# Patient Record
Sex: FEMALE | Race: WHITE | NOT HISPANIC OR LATINO | URBAN - METROPOLITAN AREA
[De-identification: names, ages, dates, MRNs, and addresses within clinical notes are randomized per-mention and may not be internally consistent; named-entity substitution may affect disease eponyms.]

---

## 2017-11-16 ENCOUNTER — RECORDS - HEALTHEAST (OUTPATIENT)
Dept: LAB | Facility: CLINIC | Age: 82
End: 2017-11-16

## 2017-11-16 LAB
CHOLEST SERPL-MCNC: 169 MG/DL
FASTING STATUS PATIENT QL REPORTED: NORMAL
HDLC SERPL-MCNC: 56 MG/DL
LDLC SERPL CALC-MCNC: 93 MG/DL
TRIGL SERPL-MCNC: 100 MG/DL

## 2018-01-22 ENCOUNTER — RECORDS - HEALTHEAST (OUTPATIENT)
Dept: LAB | Facility: CLINIC | Age: 83
End: 2018-01-22

## 2018-01-24 LAB — BACTERIA SPEC CULT: ABNORMAL

## 2018-07-31 ENCOUNTER — RECORDS - HEALTHEAST (OUTPATIENT)
Dept: LAB | Facility: CLINIC | Age: 83
End: 2018-07-31

## 2018-08-03 LAB — BACTERIA SPEC CULT: ABNORMAL

## 2018-08-16 ENCOUNTER — RECORDS - HEALTHEAST (OUTPATIENT)
Dept: LAB | Facility: CLINIC | Age: 83
End: 2018-08-16

## 2018-08-16 LAB
ANION GAP SERPL CALCULATED.3IONS-SCNC: 9 MMOL/L (ref 5–18)
BUN SERPL-MCNC: 31 MG/DL (ref 8–28)
CALCIUM SERPL-MCNC: 9.7 MG/DL (ref 8.5–10.5)
CHLORIDE BLD-SCNC: 106 MMOL/L (ref 98–107)
CO2 SERPL-SCNC: 26 MMOL/L (ref 22–31)
CREAT SERPL-MCNC: 1 MG/DL (ref 0.6–1.1)
DIGOXIN LEVEL LHE- HISTORICAL: 0.9 NG/ML (ref 0.5–2)
GFR SERPL CREATININE-BSD FRML MDRD: 53 ML/MIN/1.73M2
GLUCOSE BLD-MCNC: 102 MG/DL (ref 70–125)
POTASSIUM BLD-SCNC: 4.3 MMOL/L (ref 3.5–5)
SODIUM SERPL-SCNC: 141 MMOL/L (ref 136–145)

## 2019-04-16 ENCOUNTER — RECORDS - HEALTHEAST (OUTPATIENT)
Dept: ADMINISTRATIVE | Facility: OTHER | Age: 84
End: 2019-04-16

## 2019-04-17 ENCOUNTER — RECORDS - HEALTHEAST (OUTPATIENT)
Dept: LAB | Facility: CLINIC | Age: 84
End: 2019-04-17

## 2019-04-17 LAB
ANION GAP SERPL CALCULATED.3IONS-SCNC: 12 MMOL/L (ref 5–18)
BUN SERPL-MCNC: 22 MG/DL (ref 8–28)
CALCIUM SERPL-MCNC: 10.1 MG/DL (ref 8.5–10.5)
CHLORIDE BLD-SCNC: 103 MMOL/L (ref 98–107)
CO2 SERPL-SCNC: 25 MMOL/L (ref 22–31)
CREAT SERPL-MCNC: 0.99 MG/DL (ref 0.6–1.1)
DIGOXIN LEVEL LHE- HISTORICAL: 0.8 NG/ML (ref 0.5–2)
GFR SERPL CREATININE-BSD FRML MDRD: 53 ML/MIN/1.73M2
GLUCOSE BLD-MCNC: 107 MG/DL (ref 70–125)
POTASSIUM BLD-SCNC: 4.5 MMOL/L (ref 3.5–5)
SODIUM SERPL-SCNC: 140 MMOL/L (ref 136–145)

## 2019-04-24 ENCOUNTER — COMMUNICATION - HEALTHEAST (OUTPATIENT)
Dept: TELEHEALTH | Facility: CLINIC | Age: 84
End: 2019-04-24

## 2019-04-24 ENCOUNTER — HOSPITAL ENCOUNTER (OUTPATIENT)
Dept: NUCLEAR MEDICINE | Facility: CLINIC | Age: 84
Discharge: HOME | End: 2019-04-24
Attending: ORTHOPAEDIC SURGERY

## 2019-04-24 DIAGNOSIS — M17.9 OSTEOARTHRITIS OF KNEE: ICD-10-CM

## 2019-06-05 ENCOUNTER — OFFICE VISIT - HEALTHEAST (OUTPATIENT)
Dept: VASCULAR SURGERY | Facility: CLINIC | Age: 84
End: 2019-06-05

## 2019-06-05 DIAGNOSIS — I87.8 VENOUS STASIS: ICD-10-CM

## 2019-06-05 RX ORDER — VIT A/VIT C/VIT E/ZINC/COPPER 7160-113
TABLET, DELAYED RELEASE (ENTERIC COATED) ORAL 2 TIMES DAILY
Status: SHIPPED | COMMUNITY
Start: 2019-06-05

## 2019-06-05 RX ORDER — DILTIAZEM HYDROCHLORIDE 360 MG/1
CAPSULE, EXTENDED RELEASE ORAL
Status: SHIPPED | COMMUNITY
Start: 2019-05-27

## 2019-06-05 ASSESSMENT — MIFFLIN-ST. JEOR: SCORE: 1664.66

## 2019-06-12 ENCOUNTER — AMBULATORY - HEALTHEAST (OUTPATIENT)
Dept: OTHER | Facility: CLINIC | Age: 84
End: 2019-06-12

## 2019-09-04 ENCOUNTER — OFFICE VISIT - HEALTHEAST (OUTPATIENT)
Dept: VASCULAR SURGERY | Facility: CLINIC | Age: 84
End: 2019-09-04

## 2019-09-04 ENCOUNTER — RECORDS - HEALTHEAST (OUTPATIENT)
Dept: VASCULAR ULTRASOUND | Facility: CLINIC | Age: 84
End: 2019-09-04

## 2019-09-04 DIAGNOSIS — I87.8 VENOUS STASIS: ICD-10-CM

## 2019-09-04 DIAGNOSIS — I87.8 OTHER SPECIFIED DISORDERS OF VEINS: ICD-10-CM

## 2020-01-23 ENCOUNTER — RECORDS - HEALTHEAST (OUTPATIENT)
Dept: LAB | Facility: CLINIC | Age: 85
End: 2020-01-23

## 2020-01-23 LAB
ANION GAP SERPL CALCULATED.3IONS-SCNC: 7 MMOL/L (ref 5–18)
BUN SERPL-MCNC: 16 MG/DL (ref 8–28)
CALCIUM SERPL-MCNC: 9.9 MG/DL (ref 8.5–10.5)
CHLORIDE BLD-SCNC: 102 MMOL/L (ref 98–107)
CO2 SERPL-SCNC: 32 MMOL/L (ref 22–31)
CREAT SERPL-MCNC: 1.07 MG/DL (ref 0.6–1.1)
DIGOXIN LEVEL LHE- HISTORICAL: 0.8 NG/ML (ref 0.5–2)
GFR SERPL CREATININE-BSD FRML MDRD: 49 ML/MIN/1.73M2
GLUCOSE BLD-MCNC: 86 MG/DL (ref 70–125)
POTASSIUM BLD-SCNC: 3.9 MMOL/L (ref 3.5–5)
SODIUM SERPL-SCNC: 141 MMOL/L (ref 136–145)

## 2020-04-17 ENCOUNTER — RECORDS - HEALTHEAST (OUTPATIENT)
Dept: LAB | Facility: CLINIC | Age: 85
End: 2020-04-17

## 2020-04-19 LAB — BACTERIA SPEC CULT: ABNORMAL

## 2020-04-30 ENCOUNTER — RECORDS - HEALTHEAST (OUTPATIENT)
Dept: LAB | Facility: CLINIC | Age: 85
End: 2020-04-30

## 2020-05-02 LAB — BACTERIA SPEC CULT: ABNORMAL

## 2020-07-21 ENCOUNTER — RECORDS - HEALTHEAST (OUTPATIENT)
Dept: LAB | Facility: CLINIC | Age: 85
End: 2020-07-21

## 2020-07-22 LAB
ANION GAP SERPL CALCULATED.3IONS-SCNC: 9 MMOL/L (ref 5–18)
BUN SERPL-MCNC: 15 MG/DL (ref 8–28)
CALCIUM SERPL-MCNC: 9.2 MG/DL (ref 8.5–10.5)
CHLORIDE BLD-SCNC: 104 MMOL/L (ref 98–107)
CHOLEST SERPL-MCNC: 158 MG/DL
CO2 SERPL-SCNC: 28 MMOL/L (ref 22–31)
CREAT SERPL-MCNC: 0.93 MG/DL (ref 0.6–1.1)
DIGOXIN LEVEL LHE- HISTORICAL: 0.7 NG/ML (ref 0.5–2)
FASTING STATUS PATIENT QL REPORTED: NORMAL
GFR SERPL CREATININE-BSD FRML MDRD: 57 ML/MIN/1.73M2
GLUCOSE BLD-MCNC: 92 MG/DL (ref 70–125)
HDLC SERPL-MCNC: 54 MG/DL
LDLC SERPL CALC-MCNC: 81 MG/DL
POTASSIUM BLD-SCNC: 3.9 MMOL/L (ref 3.5–5)
SODIUM SERPL-SCNC: 141 MMOL/L (ref 136–145)
TRIGL SERPL-MCNC: 114 MG/DL

## 2021-04-13 ENCOUNTER — RECORDS - HEALTHEAST (OUTPATIENT)
Dept: LAB | Facility: CLINIC | Age: 86
End: 2021-04-13

## 2021-04-15 LAB — BACTERIA SPEC CULT: ABNORMAL

## 2021-05-29 ENCOUNTER — RECORDS - HEALTHEAST (OUTPATIENT)
Dept: ADMINISTRATIVE | Facility: CLINIC | Age: 86
End: 2021-05-29

## 2021-05-29 NOTE — PROGRESS NOTES
This is a new consult for Varicose Veins. The patient has varicose veins that are problematic in bilateral legs. Symptoms patient has been experiencing are heaviness, aching,  itching, tiredness, cramps, discoloration and  swelling. Patient has not been wearing compression stockings.     Discoloration  is present.  Pt has not been using pain medication or antiinflammatory's.

## 2021-05-29 NOTE — PROGRESS NOTES
S: Patient was seen in [place] to be measured for knee-high velcro calf pieces. RX on-file from Dr. Morrow is current.    O: Goal is to evaluate and measure patient for a compression garment that will help control her venous insufficiency. Observations show there is swelling present. The expected outcome with compliant use is to reduce swelling and control the patient s condition.     A: I took measurements for Juxtalite HD Velcro calf pieces and sized her into a L-X, Short. This garment when held up to her leg was too short so we donned an L-X, Long. This garment had too much fabric at the top of her calf. She sizes right on the line of a Large and Large-X so we then tried the Large, Long, which was a great fit. I assisted Savita in donning her compression garments. Once donned, the garments appeared to be fitting well and she stated they were comfortable. She was instructed on the donning and doffing process, the uses of the garments, and how to care for the items. She went home with two JuxtaLite HD Velcro calf pieces in the large, long. She paid OOP with the 20% discount for the garments.    P: She is to call with any further needs.  Goal is to maintain a home program.

## 2021-05-31 ENCOUNTER — RECORDS - HEALTHEAST (OUTPATIENT)
Dept: ADMINISTRATIVE | Facility: CLINIC | Age: 86
End: 2021-05-31

## 2021-06-01 NOTE — PROGRESS NOTES
This is a follow up for Varicose Veins. The patient has varicose veins that are problematic in bilateral legs. Symptoms patient has been experiencing are heaviness, aching,  itching, tiredness, cramps, discoloration and swelling. Patient has been wearing compression stockings. Patient had US prior to appointment.

## 2021-06-01 NOTE — PROGRESS NOTES
[facility] Surgery Follow up    HPI:    86 y.o. year old female who returns for a follow up.  I saw her here before back in June and she is here for follow-up having her ultrasound done today.  She has leg troubles and pain symptoms mostly of her left leg and knee region.    Allergies:Patient has no known allergies.    Past Medical History:   Diagnosis Date     Atrial fibrillation (H)      DVT (deep venous thrombosis) (H) 1961    left leg     History of anesthesia complications      Hypertension      Left hemiparesis (H) 2007     Osteoarthritis      PONV (postoperative nausea and vomiting)      Stroke (H) 2007     Urinary incontinence        Past Surgical History:   Procedure Laterality Date     APPENDECTOMY       CATARACT EXTRACTION       CHOLECYSTECTOMY       FOOT SURGERY Right      HYSTERECTOMY       ID APPENDECTOMY      Description: Appendectomy;  Recorded: 07/31/2012;     ID REMOVAL GALLBLADDER      Description: Cholecystectomy;  Recorded: 07/31/2012;     ID TOTAL KNEE ARTHROPLASTY Right 12/2/2015    Procedure: TOTAL KNEE ARTHROPLASTY, RIGHT;  Surgeon: Killian Roberts MD;  Location: [facility];  Service: Orthopedics       CURRENT MEDS:  Current Outpatient Medications on File Prior to Visit   Medication Sig Dispense Refill     digoxin (LANOXIN) 125 mcg tablet Take 125 mcg by mouth daily.       diltiazem (TIAZAC) 360 MG 24 hr capsule        Lactobacillus rhamnosus GG (CULTURELLE) 10-15 Billion cell capsule Take 1 capsule by mouth daily.       lisinopril (PRINIVIL,ZESTRIL) 20 MG tablet Take 40 mg by mouth daily.        MULTIVITAMIN ORAL Take 1 tablet by mouth daily.       vit A/vit C/vit E/zinc/copper (ICAPS AREDS ORAL) Take by mouth 2 (two) times a day.       warfarin (COUMADIN) 6 MG tablet Take by mouth See Admin Instructions. New Order: Coumadin 6mg po daily.             No current facility-administered medications on file prior to visit.        Family History   Problem Relation Age of Onset     Cancer  Mother      Heart disease Father      Anesthesia problems Neg Hx      Clotting disorder Neg Hx         reports that she has never smoked. She has never used smokeless tobacco. She reports that she does not drink alcohol or use drugs.    Review of Systems:  Negative except left leg pain mostly centered on her left knee.  His ambulation issues secondary to her knee issues she also has kind of balance issues going on.    OBJECTIVE:  There were no vitals filed for this visit.  There is no height or weight on file to calculate BMI.    EXAM:  GENERAL: This is a well-developed 86 y.o. female who appears her stated age  HEAD: normocephalic  HEENT: Pupils equal and reactive bilaterally  CARDIAC: RRR without murmur  CHEST/LUNG:  Clear to auscultation  ABDOMEN: Soft, nontender, nondistended, no masses    NEUROLOGIC: Focally intact, nonfocal  VASCULAR: Pulses intact, symmetrical upper and lower extremities.      LABS:  Lab Results   Component Value Date    WBC 4.9 12/02/2015    HGB 8.6 (L) 12/06/2015    HCT 41.5 12/02/2015    MCV 91 12/02/2015     12/02/2015     INR/Prothrombin Time      No results found for: HGBA1C  No results found for: ALT, AST, GGT, ALKPHOS, BILITOT     Images:     US Venous Insufficiency Legs Bilateral (Order [id number])   Imaging   Date: 9/4/2019 Department: [facility] Ultrasound [place] Released By: Lilliana Santos Authorizing: Hadley Morrow MD   Study Result     [facility]  LOCATION: [facility]  DATE: 9/4/2019     EXAM: BILATERAL LOWER EXTREMITY DEEP AND SUPERFICIAL VENOUS DUPLEX ULTRASOUND WITH PHYSIOLOGIC TESTING      INDICATION: Symptomatic varicose veins, leg pain, leg swelling. Assess for incompetent veins.      TECHNIQUE: Supine and upright ultrasound of the deep and superficial veins with Valsalva and compression augmentation maneuvers. Duplex imaging is performed utilizing gray-scale, two-dimensional images, color-flow imaging,  Doppler waveform analysis, and   spectral Doppler imaging.      INCOMPETENCY CRITERIA: Deep vein reflux reported when greater than 1,000 ms flow reversal. Superficial vein reflux reported when greater than 600 ms flow reversal.  vein reflux reported as greater than 350 ms flow reversal.     RIGHT DEEP VEIN FINDINGS:     Normal compressibility and phasicity of the common femoral, profunda femoris, femoral, popliteal, and posterior tibial veins, without deep venous thrombus. There is reflux of Valsalva and augmentation within the common femoral, femoral vein, and   popliteal vein.     LEFT DEEP VEIN FINDINGS:     Normal compressibility and phasicity of the common femoral, profunda femoris, femoral, popliteal, and posterior tibial veins, without deep venous thrombus. There is reflux with Valsalva and augmentation within the common femoral and upper leg femoral   vein.     RIGHT SUPERFICIAL VEIN FINDINGS:  Great saphenous vein: Segmental venous incompetence involving the saphenofemoral junction to the proximal thigh. Vein diameter and spans 10 mm proximally to 6 mm distally. Vein is competent at the knee and mid calf.     Small saphenous vein: Competent from the saphenopopliteal junction to the mid calf.     No incompetent perforating veins or abnormal accessory veins identified.     LEFT SUPERFICIAL VEIN FINDINGS:  Great saphenous vein: Segmental incompetence at the saphenofemoral junction where the vein measures 9 mm in diameter. It is competent within the proximal thigh measuring 6 mm in diameter. Incompetence at the level of the knee measuring 5 mm diameter. It   is competent at the mid calf measuring 3 mm in diameter.     Small saphenous vein: Competent from the saphenopopliteal junction to the mid calf.     There is a left anterior accessory saphenous vein with venous incompetence measuring 9 mm proximally and 6 mm at the mid thigh.     IMPRESSION:   CONCLUSION:   1.  No deep venous thrombosis of either  lower extremity.  2.  Multisegment venous incompetence of the right great saphenous vein.  3.  Multisegment venous incompetence of the left great saphenous vein. Incompetent left anterior accessory saphenous vein spanning the proximal and mid thigh         Assessment/Plan:   1. Venous stasis    Patient has a significant amount of pain she had a right knee replaced.  The knee replacement Salter pain issues of her leg around her knee but she unfortunately had significant cardiac events and was in the ICU for 4 days.  She now has problems on her left leg mostly this is the same as her right was there so I thought it is from her orthopedic surgeon this could be vein related.  Her pain is lateral just involves the knee itself and we did a work-up today with an ultrasound.  Unfortunately her ultrasound shows that she has deep vein issues.  These issues are probably not amenable much closure issues like I could close one-vessel but I do not think that will solve the issues that she has and her deep problems are bright not contributing to the localized pain that she has around    At her age of 86 years of age she says that she does not do anything for her knee as far as replacement but she may get an injection I think she should proceed with that.  He could should continue to wear compression socks as she is been doing and she is understood in pursuing any other kind of procedure which will not help her with her deep insufficiency a closure procedure may only help some some visualized veins will not help her ultimately overall picture.          Return if symptoms worsen or fail to improve.     Hadley Morrow MD  [facility] Department of Surgery

## 2021-06-02 VITALS — BODY MASS INDEX: 33.05 KG/M2 | WEIGHT: 244 LBS | HEIGHT: 72 IN

## 2021-06-17 NOTE — PATIENT INSTRUCTIONS - HE
"Patient Instructions by Altagracia Vazquez LPN at 9/4/2019 11:40 AM     Author: Altagracia Vazquez LPN Service: -- Author Type: Licensed Nurse    Filed: 9/4/2019 11:43 AM Encounter Date: 9/4/2019 Status: Signed    : Altagracia Vazquez LPN (Licensed Nurse)       You should wear this socks as much as you can. It is especially important to wear them with long periods of sitting/standing, long car rides or if you will be flying. Compression socks should get refilled every 4-6 months. They do not need to be worn at night while in bed.    If you do a lot of standing it is good to do calf raises to help keep the blood pumping. If you sit a lot at work it is good to get up periodically to walk around. Elevation of the foot of your bed 4-6\" helps the blood return back to where it is needed.        Varicose Veins      Varicose veins are swollen, enlarged veins most often found in the legs. They are usually blue or purple in color and may bulge, twist, and stand out under the skin.  Normally, veins return blood from the body to the heart. The leg veins have one-way valves that prevent blood from flowing backward in the vein. When the valves are weak or damaged, blood backs up in the veins. This may cause some of the veins to swell and bulge and become varicose veins.  Symptoms  Varicose veins may or may not cause symptoms. If symptoms do occur, they can include:    Legs that feel tired, achy, heavy, or itchy    Leg muscle cramps    Skin changes, such as discoloration, dryness, redness, or rash (in more severe cases, you may also have sores on the skin called venous leg ulcers)  Risk Factors  There are a number of factors that increase the risk for varicose veins. These can include:    Being a woman    Being older    Sitting or standing for long periods    Being overweight    Being pregnant    Having a family history of varicose veins  Treatment begins with simple self-help measures (see below). If these dont help, " there are many procedures that can be done to shrink or remove varicose veins. Your healthcare provider can tell you more about these options, if needed.  Home care    Support or compression stockings will likely be prescribed. If so, be sure to wear them as directed. They may help improve blood flow.    Exercising helps strengthen your leg muscles and improve blood flow. To get the most benefit, choose exercises such as walking, swimming, or cycling. Also try to exercise for at least 30 minutes on most days.    Raising (elevating) your legs lets gravity help blood flow back to the heart. Sit or lie with your feet above heart level a few times throughout the day, or as directed.    Avoid long periods of sitting or standing. Change positions often. Also, move your ankles, toes and knees often. This may also help improve blood flow.    If you are overweight, talk with your healthcare provider about setting up a weight-loss plan. Maintaining a healthy weight can help reduce the strain on your veins. It may also improve symptoms, such as swelling and aching.    If you have dryness and itching, ask your provider about special lotions that can be applied to the skin to help improve symptoms.  Follow-up care  Follow up with your healthcare provider, or as directed. If imaging tests were done, youll be told the results and if there are any new findings that affect your care.  When to seek medical advice  Call your healthcare provider right away if any of these occur:    Sudden, severe leg swelling, pain, or redness    Symptoms worsen, or they dont improve with self-care    Bleeding from any affected veins    Ulcers form on the legs, ankles, or feet    Fever of 100.4 F (38 C) or higher, or as advised by your provider      Understanding Spider and Varicose Veins  Do you often hide your legs because of the way they look? You may have noticed tiny red or blue bursts (spider veins). Or maybe you have veins that bulge or look  twisted (varicose veins). If so, there are treatments that can help  What are the symptoms?  Spider veins or varicose veins may never be a problem. But sometimes they can cause legs to ache or swell. Your legs may also feel heavy and tired, or like theyre burning. These symptoms may be more severe at the end of the day. Prolonged sitting or standing can also make your symptoms worse.  Who gets spider and varicose veins?  Anyone can get spider or varicose veins. But vein problems tend to be hereditary (run in families). Other factors that can affect veins include:    Pregnancy, hormones, and birth control pills    A job where you stand or sit a lot    Extra weight or lack of exercise    Age         Spider veins look like tiny webs on the ankles, legs, and upper thighs.       Ropy, dark blue, red, or flesh-colored varicose veins are most common on the thighs, calves, and feet.    What can be done?  Spider and varicose veins can affect the way you feel about yourself. Talk to your healthcare provider about your concerns. There are treatments that can ease symptoms and make your legs look better.  Your treatment choices  Treatment may include self-care, sclerotherapy (injecting veins with a chemical), surgery, or newer nonsurgical minimally invasive therapies. Spider veins and some varicose veins can be treated with sclerotherapy. Large varicose veins can often be treated with newer minimally invasive procedures and, in rare cases, surgery may be needed.     Please call [facility] to schedule an appointment. If you received a prescription please bring it with you to your appointment. You may call one of the locations below, although some locations are limited to what they carry.    Office Locations  New Locations  [facility]  Home Medical Equipment  [address], [facility] [id number] [address]  [facility] ([facility])  [address], [facility]  [place] [address]  Phone [phone] /Fax [phone]        [facility]   [address]   Medical Equipment [address]/Orthotics and Prosthetics [address]  [place] [address]   Phone: [phone]  Fax [phone]    [facility]  [address]  [address]  [place] [address]  Phone: [phone]  Fax: [phone]    [facility] Medical Bld[facility]   [address]  [place] [address]  Phone: [phone]  Fax: [phone]    [facility] Professional Bldg[facility]  [address]  [place] [address]  Phone: [phone]  Fax: [phone]    [facility]  [address]  [place] [address]  Phone: [phone]  Fax: [phone]    [facility]  [address]   [place] [address]   Phone: [phone]  Fax: [phone]    [place]   [address] [place] [address]   Phone: [phone]  Fax: [phone]    [facility]  [address]  [place] [address]    [place] [phone]  [phone]  [place] [phone]  [email or web address]      [facility]   [address]             [address]                 [address]     [place] [address]      [place] [address]         [place] [address]  [phone]  [phone]     [phone]               Fax: [phone]  [email or web address]                                                     [facility]  [address] [address]  [phone]  [phone]  [email or web address]    Kenisha Huerta  [phone]  [email or web address]    [facility] Medical supply   [phone]    [facility]  [address]  [place], [place] [address] [phone]

## 2021-06-17 NOTE — PATIENT INSTRUCTIONS - HE
"Patient Instructions by Altagracia Vazquez LPN at 6/5/2019 11:00 AM     Author: Altagracia Vazquez LPN Service: -- Author Type: Licensed Nurse    Filed: 6/5/2019 11:29 AM Encounter Date: 6/5/2019 Status: Addendum    : Altagracia Vazquez LPN (Licensed Nurse)    Related Notes: Original Note by Altagracia Vazquez LPN (Licensed Nurse) filed at 6/5/2019 10:56 AM       We are prescribing some compression stockings for you. I have included different suppliers that should help you get measured and fitting to ensure proper fitting socks. You should wear this socks as much as you can. It is especially important to wear them with long periods of sitting/standing, long car rides or if you will be flying. Compression socks should get refilled every 4-6 months. They do not need to be worn at night while in bed.    If you do a lot of standing it is good to do calf raises to help keep the blood pumping. If you sit a lot at work it is good to get up periodically to walk around. Elevation of the foot of your bed 4-6\" helps the blood return back to where it is needed.    We would like you to follow up in 3 months after wearing socks to see how you are doing.    Varicose Veins      Varicose veins are swollen, enlarged veins most often found in the legs. They are usually blue or purple in color and may bulge, twist, and stand out under the skin.  Normally, veins return blood from the body to the heart. The leg veins have one-way valves that prevent blood from flowing backward in the vein. When the valves are weak or damaged, blood backs up in the veins. This may cause some of the veins to swell and bulge and become varicose veins.  Symptoms  Varicose veins may or may not cause symptoms. If symptoms do occur, they can include:    Legs that feel tired, achy, heavy, or itchy    Leg muscle cramps    Skin changes, such as discoloration, dryness, redness, or rash (in more severe cases, you may also have sores on the skin called " venous leg ulcers)  Risk Factors  There are a number of factors that increase the risk for varicose veins. These can include:    Being a woman    Being older    Sitting or standing for long periods    Being overweight    Being pregnant    Having a family history of varicose veins  Treatment begins with simple self-help measures (see below). If these dont help, there are many procedures that can be done to shrink or remove varicose veins. Your healthcare provider can tell you more about these options, if needed.  Home care    Support or compression stockings will likely be prescribed. If so, be sure to wear them as directed. They may help improve blood flow.    Exercising helps strengthen your leg muscles and improve blood flow. To get the most benefit, choose exercises such as walking, swimming, or cycling. Also try to exercise for at least 30 minutes on most days.    Raising (elevating) your legs lets gravity help blood flow back to the heart. Sit or lie with your feet above heart level a few times throughout the day, or as directed.    Avoid long periods of sitting or standing. Change positions often. Also, move your ankles, toes and knees often. This may also help improve blood flow.    If you are overweight, talk with your healthcare provider about setting up a weight-loss plan. Maintaining a healthy weight can help reduce the strain on your veins. It may also improve symptoms, such as swelling and aching.    If you have dryness and itching, ask your provider about special lotions that can be applied to the skin to help improve symptoms.  Follow-up care  Follow up with your healthcare provider, or as directed. If imaging tests were done, youll be told the results and if there are any new findings that affect your care.  When to seek medical advice  Call your healthcare provider right away if any of these occur:    Sudden, severe leg swelling, pain, or redness    Symptoms worsen, or they dont improve with  self-care    Bleeding from any affected veins    Ulcers form on the legs, ankles, or feet    Fever of 100.4 F (38 C) or higher, or as advised by your provider      Understanding Spider and Varicose Veins  Do you often hide your legs because of the way they look? You may have noticed tiny red or blue bursts (spider veins). Or maybe you have veins that bulge or look twisted (varicose veins). If so, there are treatments that can help  What are the symptoms?  Spider veins or varicose veins may never be a problem. But sometimes they can cause legs to ache or swell. Your legs may also feel heavy and tired, or like theyre burning. These symptoms may be more severe at the end of the day. Prolonged sitting or standing can also make your symptoms worse.  Who gets spider and varicose veins?  Anyone can get spider or varicose veins. But vein problems tend to be hereditary (run in families). Other factors that can affect veins include:    Pregnancy, hormones, and birth control pills    A job where you stand or sit a lot    Extra weight or lack of exercise    Age         Spider veins look like tiny webs on the ankles, legs, and upper thighs.       Ropy, dark blue, red, or flesh-colored varicose veins are most common on the thighs, calves, and feet.    What can be done?  Spider and varicose veins can affect the way you feel about yourself. Talk to your healthcare provider about your concerns. There are treatments that can ease symptoms and make your legs look better.  Your treatment choices  Treatment may include self-care, sclerotherapy (injecting veins with a chemical), surgery, or newer nonsurgical minimally invasive therapies. Spider veins and some varicose veins can be treated with sclerotherapy. Large varicose veins can often be treated with newer minimally invasive procedures and, in rare cases, surgery may be needed.     Please call [facility] to schedule an appointment. If you received a  prescription please bring it with you to your appointment. You may call one of the locations below, although some locations are limited to what they carry.    Office Locations  New Locations  [facility]  Home Medical Equipment  [address], [place] [address]  Orthotics and Prosthetics ([facility])  [address], [place] [address]  Phone [phone] /Fax [phone]        [facility]   [address]   Medical Equipment [address]/Orthotics and Prosthetics [address]  [place] [address]   Phone: [phone]  Fax [phone]    [facility]  [address]  [address]  [place] [address]  Phone: [phone]  Fax: [phone]    [facility]   [address]  [place] [address]  Phone: [phone]  Fax: [phone]    [facility]  [address]  [place] [address]  Phone: [phone]  Fax: [phone]    [facility]  [address]  [address]  [place] [address]  Phone: [phone]  Fax: [phone]    [facility]  [address]  [address]   [place] [address]   Phone: [phone]  Fax: [phone]    [place]   [address] [place] [address]   Phone: [phone]  Fax: [phone]    [facility]  [address]  [place] [address]    [place] [phone]  [phone]  [phone]  [place] [phone]  [email or web address]      [facility]   [address]             [address]                 [address]     [place] [address]      [place] [address]         [place], [place] [id number] [phone]                        [phone]  [phone]     [phone]               Fax: [phone]  [email or web address]                                                     [facility]  [address]  [place], [place] [address] [phone]  [phone]  [email or web address]    Kenisha Huerta  [phone]  [email or web address]    [facility]   [phone]    [address] [place] [address] [phone]      sudha you for choosing [facility] as partners in your care.  Please read the following information about the procedure you had today.

## 2021-06-27 NOTE — PROGRESS NOTES
Progress Notes by Hadley Morrow MD at 6/5/2019 11:00 AM     Author: Hadley Morrow MD Service: -- Author Type: Physician    Filed: 6/6/2019  8:58 AM Encounter Date: 6/5/2019 Status: Signed    : Hadley Morrow MD (Physician)       [facility]      Assessment:     1. varicose veins, bilateral   2. spider veins, bilateral   3. Venous stasis changes bilaterally    Plan:     1. Treatment options of conservative therapy of stockings use, exercise, weight loss,  elevating legs when possible.    2. Script for compression stockings 20-30 mm hg  3. Ultrasound to evaluate legs for incompetency of both deep and superficial system .   4. Surgical treatment, discussed options briefly  5. Follow up: 3 months.   6. Call for any questions concerns or issues    Subjective:      Janet F Lombard is a 86 y.o. female  who was referred by Wing Montague MD  for evaluation of varicose veins. Symptoms include pain, aching, fatigue, burning, edema, dermatitis and episodes of superficial thrombophlebitis. Patient has history of leg selling, pain and vein issues that have progressed. Pain and symptoms have affected daily living and work activities needing medications. Here for evaluation today. no stocking or compression devic use    Allergies:Patient has no known allergies.    Past Medical History:   Diagnosis Date   ? Atrial fibrillation (H)    ? DVT (deep venous thrombosis) (H) 1961    left leg   ? History of anesthesia complications    ? Hypertension    ? Left hemiparesis (H) 2007   ? Osteoarthritis    ? PONV (postoperative nausea and vomiting)    ? Stroke (H) 2007   ? Urinary incontinence        Past Surgical History:   Procedure Laterality Date   ? APPENDECTOMY     ? CATARACT EXTRACTION     ? CHOLECYSTECTOMY     ? FOOT SURGERY Right    ? HYSTERECTOMY     ? HI APPENDECTOMY      Description: Appendectomy;  Recorded: 07/31/2012;   ? HI REMOVAL GALLBLADDER      Description: Cholecystectomy;  Recorded:  "07/31/2012;   ? KS TOTAL KNEE ARTHROPLASTY Right 12/2/2015    Procedure: TOTAL KNEE ARTHROPLASTY, RIGHT;  Surgeon: Killian Roberts MD;  Location: [facility];  Service: Orthopedics       Current Outpatient Medications   Medication Sig Note   ? digoxin (LANOXIN) 125 mcg tablet Take 125 mcg by mouth daily.    ? diltiazem (TIAZAC) 360 MG 24 hr capsule     ? Lactobacillus rhamnosus GG (CULTURELLE) 10-15 Billion cell capsule Take 1 capsule by mouth daily.    ? lisinopril (PRINIVIL,ZESTRIL) 20 MG tablet Take 40 mg by mouth daily.  12/2/2015: 2 x 20 mg   ? MULTIVITAMIN ORAL Take 1 tablet by mouth daily.    ? vit A/vit C/vit E/zinc/copper (ICAPS AREDS ORAL) Take by mouth 2 (two) times a day.    ? warfarin (COUMADIN) 6 MG tablet Take  by mouth See Admin Instructions. New Order: Coumadin 3mg po q Saturday, then Coumadin 6mg po q all other days.          Family History   Problem Relation Age of Onset   ? Cancer Mother    ? Heart disease Father    ? Anesthesia problems Neg Hx    ? Clotting disorder Neg Hx         reports that she has never smoked. She has never used smokeless tobacco. She reports that she does not drink alcohol or use drugs.      Review of Systems  Pertinent items are noted in HPI.  A 12 point comprehensive review of systems was negative except as noted.      Objective:     Vitals:    06/05/19 1047   BP: 148/86   Patient Site: Left Arm   Patient Position: Sitting   Cuff Size: Adult Regular   Pulse: 60   Resp: 16   Temp: 97.5  F (36.4  C)   TempSrc: Oral   Weight: (!) 244 lb (110.7 kg)   Height: 6' 1\" (1.854 m)     Body mass index is 32.19 kg/m .    EXAM:  GENERAL: This is a well-developed 86 y.o. female who appears her stated age  HEAD: normocephalic  HEENT: Pupils equal and reactive bilaterally  MOUTH: mucus membranes intact. Normal dentation  CARDIAC: RRR without murmur  CHEST/LUNG:  Clear to auscultation bilaterally  ABDOMEN: Soft, nontender, nondistended, no masses noted   NEUROLOGIC: Focally intact, " nonfocal, alert and oriented x 3  INTEGUMENT: No open lesions or ulcers  VASCULAR: Pulses intact, symmetrical upper and lower extremities. There areskin changes consistent with chronic venous insufficiency. Varicose veins present in bilateral greater saphenous distribution. Spider veins present bilateral.            Imaging:  pending    Hadley Morrow MD  [facility]

## 2021-09-20 ENCOUNTER — LAB REQUISITION (OUTPATIENT)
Dept: LAB | Facility: CLINIC | Age: 86
End: 2021-09-20

## 2021-09-20 DIAGNOSIS — I10 ESSENTIAL (PRIMARY) HYPERTENSION: ICD-10-CM

## 2021-09-20 LAB
ANION GAP SERPL CALCULATED.3IONS-SCNC: 9 MMOL/L (ref 5–18)
BUN SERPL-MCNC: 11 MG/DL (ref 8–28)
CALCIUM SERPL-MCNC: 9.6 MG/DL (ref 8.5–10.5)
CHLORIDE BLD-SCNC: 106 MMOL/L (ref 98–107)
CHOLEST SERPL-MCNC: 162 MG/DL
CO2 SERPL-SCNC: 28 MMOL/L (ref 22–31)
CREAT SERPL-MCNC: 0.87 MG/DL (ref 0.6–1.1)
DIGOXIN SERPL-MCNC: 0.8 UG/L
GFR SERPL CREATININE-BSD FRML MDRD: 60 ML/MIN/1.73M2
GLUCOSE BLD-MCNC: 94 MG/DL (ref 70–125)
HDLC SERPL-MCNC: 56 MG/DL
LDLC SERPL CALC-MCNC: 85 MG/DL
POTASSIUM BLD-SCNC: 3.8 MMOL/L (ref 3.5–5)
SODIUM SERPL-SCNC: 143 MMOL/L (ref 136–145)
TRIGL SERPL-MCNC: 104 MG/DL

## 2021-09-20 PROCEDURE — 80061 LIPID PANEL: CPT | Performed by: FAMILY MEDICINE

## 2021-09-20 PROCEDURE — 80162 ASSAY OF DIGOXIN TOTAL: CPT | Performed by: FAMILY MEDICINE

## 2021-09-20 PROCEDURE — 80048 BASIC METABOLIC PNL TOTAL CA: CPT | Performed by: FAMILY MEDICINE

## 2021-12-28 ENCOUNTER — LAB REQUISITION (OUTPATIENT)
Dept: LAB | Facility: CLINIC | Age: 86
End: 2021-12-28

## 2021-12-28 DIAGNOSIS — Z87.440 PERSONAL HISTORY OF URINARY (TRACT) INFECTIONS: ICD-10-CM

## 2021-12-28 PROCEDURE — 87086 URINE CULTURE/COLONY COUNT: CPT | Performed by: FAMILY MEDICINE

## 2021-12-30 ENCOUNTER — LAB REQUISITION (OUTPATIENT)
Dept: LAB | Facility: CLINIC | Age: 86
End: 2021-12-30
Payer: COMMERCIAL

## 2021-12-30 DIAGNOSIS — I10 ESSENTIAL (PRIMARY) HYPERTENSION: ICD-10-CM

## 2021-12-30 LAB
ANION GAP SERPL CALCULATED.3IONS-SCNC: 9 MMOL/L (ref 5–18)
BACTERIA UR CULT: ABNORMAL
BUN SERPL-MCNC: 22 MG/DL (ref 8–28)
CALCIUM SERPL-MCNC: 9.6 MG/DL (ref 8.5–10.5)
CHLORIDE BLD-SCNC: 105 MMOL/L (ref 98–107)
CO2 SERPL-SCNC: 27 MMOL/L (ref 22–31)
CREAT SERPL-MCNC: 0.94 MG/DL (ref 0.6–1.1)
GFR SERPL CREATININE-BSD FRML MDRD: 58 ML/MIN/1.73M2
GLUCOSE BLD-MCNC: 92 MG/DL (ref 70–125)
POTASSIUM BLD-SCNC: 4 MMOL/L (ref 3.5–5)
SODIUM SERPL-SCNC: 141 MMOL/L (ref 136–145)

## 2021-12-30 PROCEDURE — 80048 BASIC METABOLIC PNL TOTAL CA: CPT | Performed by: STUDENT IN AN ORGANIZED HEALTH CARE EDUCATION/TRAINING PROGRAM

## 2022-01-01 ENCOUNTER — LAB REQUISITION (OUTPATIENT)
Dept: LAB | Facility: CLINIC | Age: 87
End: 2022-01-01
Payer: COMMERCIAL

## 2022-01-01 ENCOUNTER — TRANSFERRED RECORDS (OUTPATIENT)
Dept: HEALTH INFORMATION MANAGEMENT | Facility: CLINIC | Age: 87
End: 2022-01-01
Payer: COMMERCIAL

## 2022-01-01 ENCOUNTER — TRANSFERRED RECORDS (OUTPATIENT)
Dept: HEALTH INFORMATION MANAGEMENT | Facility: CLINIC | Age: 87
End: 2022-01-01

## 2022-01-01 DIAGNOSIS — R41.0 DISORIENTATION, UNSPECIFIED: ICD-10-CM

## 2022-01-01 LAB — BACTERIA UR CULT: NORMAL

## 2022-01-01 PROCEDURE — 87086 URINE CULTURE/COLONY COUNT: CPT | Mod: ORL | Performed by: STUDENT IN AN ORGANIZED HEALTH CARE EDUCATION/TRAINING PROGRAM

## 2022-01-17 ENCOUNTER — LAB REQUISITION (OUTPATIENT)
Dept: LAB | Facility: CLINIC | Age: 87
End: 2022-01-17
Payer: COMMERCIAL

## 2022-01-17 DIAGNOSIS — Z87.440 PERSONAL HISTORY OF URINARY (TRACT) INFECTIONS: ICD-10-CM

## 2022-01-17 PROCEDURE — 87086 URINE CULTURE/COLONY COUNT: CPT | Mod: ORL | Performed by: STUDENT IN AN ORGANIZED HEALTH CARE EDUCATION/TRAINING PROGRAM

## 2022-01-19 LAB — BACTERIA UR CULT: NO GROWTH

## 2022-01-31 ENCOUNTER — LAB REQUISITION (OUTPATIENT)
Dept: LAB | Facility: CLINIC | Age: 87
End: 2022-01-31
Payer: COMMERCIAL

## 2022-01-31 DIAGNOSIS — R26.81 UNSTEADINESS ON FEET: ICD-10-CM

## 2022-01-31 PROCEDURE — 83735 ASSAY OF MAGNESIUM: CPT | Mod: ORL | Performed by: STUDENT IN AN ORGANIZED HEALTH CARE EDUCATION/TRAINING PROGRAM

## 2022-01-31 PROCEDURE — 82040 ASSAY OF SERUM ALBUMIN: CPT | Performed by: STUDENT IN AN ORGANIZED HEALTH CARE EDUCATION/TRAINING PROGRAM

## 2022-01-31 PROCEDURE — 82607 VITAMIN B-12: CPT | Performed by: STUDENT IN AN ORGANIZED HEALTH CARE EDUCATION/TRAINING PROGRAM

## 2022-01-31 PROCEDURE — 84443 ASSAY THYROID STIM HORMONE: CPT | Mod: ORL | Performed by: STUDENT IN AN ORGANIZED HEALTH CARE EDUCATION/TRAINING PROGRAM

## 2022-01-31 PROCEDURE — 80053 COMPREHEN METABOLIC PANEL: CPT | Mod: ORL | Performed by: STUDENT IN AN ORGANIZED HEALTH CARE EDUCATION/TRAINING PROGRAM

## 2022-01-31 PROCEDURE — 82306 VITAMIN D 25 HYDROXY: CPT | Performed by: STUDENT IN AN ORGANIZED HEALTH CARE EDUCATION/TRAINING PROGRAM

## 2022-02-01 LAB
ALBUMIN SERPL-MCNC: 3.8 G/DL (ref 3.5–5)
ALP SERPL-CCNC: 83 U/L (ref 45–120)
ALT SERPL W P-5'-P-CCNC: 21 U/L (ref 0–45)
ANION GAP SERPL CALCULATED.3IONS-SCNC: 11 MMOL/L (ref 5–18)
AST SERPL W P-5'-P-CCNC: 21 U/L (ref 0–40)
BILIRUB SERPL-MCNC: 0.8 MG/DL (ref 0–1)
BUN SERPL-MCNC: 24 MG/DL (ref 8–28)
CALCIUM SERPL-MCNC: 9.8 MG/DL (ref 8.5–10.5)
CHLORIDE BLD-SCNC: 109 MMOL/L (ref 98–107)
CO2 SERPL-SCNC: 23 MMOL/L (ref 22–31)
CREAT SERPL-MCNC: 0.86 MG/DL (ref 0.6–1.1)
DEPRECATED CALCIDIOL+CALCIFEROL SERPL-MC: 44 UG/L (ref 30–80)
GFR SERPL CREATININE-BSD FRML MDRD: 65 ML/MIN/1.73M2
GLUCOSE BLD-MCNC: 101 MG/DL (ref 70–125)
MAGNESIUM SERPL-MCNC: 2.1 MG/DL (ref 1.8–2.6)
POTASSIUM BLD-SCNC: 4.2 MMOL/L (ref 3.5–5)
PROT SERPL-MCNC: 6.3 G/DL (ref 6–8)
SODIUM SERPL-SCNC: 143 MMOL/L (ref 136–145)
TSH SERPL DL<=0.005 MIU/L-ACNC: 2.81 UIU/ML (ref 0.3–5)
VIT B12 SERPL-MCNC: 674 PG/ML (ref 213–816)

## 2023-01-01 ENCOUNTER — TRANSFERRED RECORDS (OUTPATIENT)
Dept: HEALTH INFORMATION MANAGEMENT | Facility: CLINIC | Age: 88
End: 2023-01-01